# Patient Record
Sex: MALE | Race: OTHER
[De-identification: names, ages, dates, MRNs, and addresses within clinical notes are randomized per-mention and may not be internally consistent; named-entity substitution may affect disease eponyms.]

---

## 2018-04-04 ENCOUNTER — HOSPITAL ENCOUNTER (EMERGENCY)
Dept: HOSPITAL 72 - EMR | Age: 28
Discharge: HOME | End: 2018-04-04
Payer: COMMERCIAL

## 2018-04-04 VITALS — SYSTOLIC BLOOD PRESSURE: 106 MMHG | DIASTOLIC BLOOD PRESSURE: 67 MMHG

## 2018-04-04 VITALS — DIASTOLIC BLOOD PRESSURE: 67 MMHG | SYSTOLIC BLOOD PRESSURE: 106 MMHG

## 2018-04-04 VITALS — WEIGHT: 200 LBS | HEIGHT: 73 IN | BODY MASS INDEX: 26.51 KG/M2

## 2018-04-04 DIAGNOSIS — J06.9: Primary | ICD-10-CM

## 2018-04-04 PROCEDURE — 99284 EMERGENCY DEPT VISIT MOD MDM: CPT

## 2018-04-05 NOTE — EMERGENCY ROOM REPORT
History of Present Illness


General


Chief Complaint:  Upper Respiratory Illness


Source:  Patient





Present Illness


HPI


Patient present with complaints of cough and congestion


Ongoing for the past 4 days


Questionable low-grade fever


Denies any chest pain denies any back or flank pain


Cough has been productive


Patient has tried over-the-counter medicine as he was having more difficulty 

sleeping he presents to the ER





Denies any rash denies any recent travel denies any pleurisy


Allergies:  


Coded Allergies:  


     No Known Allergies (Unverified , 4/4/18)





Patient History


Past Medical History:  see triage record


Past Surgical History:  none


Reviewed Nursing Documentation:  PMH: Agreed; PSxH: Agreed





Nursing Documentation-PMH


Past Medical History:  No Stated History





Review of Systems


All Other Systems:  negative except mentioned in HPI





Physical Exam





Vital Signs








  Date Time  Temp Pulse Resp B/P (MAP) Pulse Ox O2 Delivery O2 Flow Rate FiO2


 


4/4/18 19:13 98.3 65 16 111/67 96 Room Air  





 98.2       








Sp02 EP Interpretation:  reviewed, normal


General Appearance:  well appearing, no apparent distress


Head:  normocephalic, atraumatic


Eyes:  bilateral eye PERRL, bilateral eye EOMI


ENT:  hearing grossly normal, normal pharynx, TMs + canals normal, uvula midline


Neck:  full range of motion, supple, no meningismus, no bony tend


Respiratory:  lungs clear, normal breath sounds, no rhonchi, no respiratory 

distress, no retraction, no accessory muscle use


Cardiovascular #1:  normal peripheral pulses, regular rate, rhythm, no edema, 

no gallop, no JVD, no murmur


Gastrointestinal:  normal bowel sounds, non tender, soft, no mass, no 

organomegaly, non-distended, no guarding, no hernia, no pulsatile mass, no 

rebound


Genitourinary:  no CVA tenderness


Musculoskeletal:  normal inspection


Neurologic:  oriented x3, responsive, CNs III-XII nml as tested, motor strength/

tone normal, sensory intact


Psychiatric:  mood/affect normal


Skin:  normal color, no rash, warm/dry, palpation normal


Lymphatic:  normal inspection, no adenopathy





Medical Decision Making


Diagnostic Impression:  


 Primary Impression:  


 Upper respiratory infection


ER Course


Patient appears clinically well


Lung sounds are appropriate


Hemodynamically appropriate and saturating well





I did not feel patient met emergency criteria for imaging will have initial 

conservative outpatient trial and return with any changes or concerns





Last Vital Signs








  Date Time  Temp Pulse Resp B/P (MAP) Pulse Ox O2 Delivery O2 Flow Rate FiO2


 


4/4/18 19:40 98.6 72 16 106/67 100 Room Air  





 98.2       








Status:  improved


Disposition:  HOME, SELF-CARE


Condition:  Stable


Scripts


Guaifenesin/Dextromethorphan (ROBITUSSIN COUGH-CHEST DM LIQ) 237 Ml Liquid


10 ML PO QHS for 7 Days, ML


   Prov: Caitlin Murphy DO         4/4/18 


Prednisone* (PREDNISONE*) 20 Mg Tablet


20 MG ORAL BID, #6 TAB


   Prov: Caitlin Murphy DO         4/4/18 


Albuterol Sulfate* (ALBUTEROL SULFATE MDI*) 8.5 Gm Hfa.aer.ad


2 PUFF INH Q6H, #1 EA 0 Refills


   Prov: Caitlin Murphy DO         4/4/18


Referrals:  


[facility],REFERRING (PCP)


Patient Instructions:  Upper Respiratory Infection, Adult





Additional Instructions:  


Patient is provided with the discharge instructions notified to follow up with 

primary doctor in the next 2-3 days otherwise return to the er with any 

worsening symptoms.


Please note that this report is being documented using DRAGON technology.  This 

can lead to erroneous entry secondary to incorrect interpretation by the 

dictating instrument.











Caitlin Murphy DO Apr 5, 2018 14:39

## 2019-01-12 ENCOUNTER — HOSPITAL ENCOUNTER (EMERGENCY)
Dept: HOSPITAL 72 - EMR | Age: 29
Discharge: HOME | End: 2019-01-12
Payer: COMMERCIAL

## 2019-01-12 VITALS — DIASTOLIC BLOOD PRESSURE: 74 MMHG | SYSTOLIC BLOOD PRESSURE: 124 MMHG

## 2019-01-12 VITALS — DIASTOLIC BLOOD PRESSURE: 76 MMHG | SYSTOLIC BLOOD PRESSURE: 124 MMHG

## 2019-01-12 VITALS — HEIGHT: 73 IN | BODY MASS INDEX: 25.45 KG/M2 | WEIGHT: 192 LBS

## 2019-01-12 DIAGNOSIS — N34.2: Primary | ICD-10-CM

## 2019-01-12 PROCEDURE — 99283 EMERGENCY DEPT VISIT LOW MDM: CPT

## 2019-01-12 PROCEDURE — 96372 THER/PROPH/DIAG INJ SC/IM: CPT

## 2019-01-12 NOTE — NUR
ED Nurse Note:



PT WALKED IN TO ER TODAY FROM HOME. AOX4. PT C/O LOWER ABDOMINAL PAIN, 6/10 X 3 
DAYS. PT DENIES ANY NAUSEA, VOMITING, OR DIARRHEA. ACTIVE BOWEL SOUNDS IN ALL 
QUADRANTS. ABDOMEN NONDISTENDED AND NONTENDER TO PALPATION. LAST BM X TODAY 
WHICH PT STATES WAS FORMED.

## 2019-01-12 NOTE — NUR
ED Nurse Note:



PT SITTING PEACEFULLY IN BED IN NAD. AOX4. PRESCRIPTION AND DISCHARGE PAPERWORK 
EXPLAINED TO PT. PT VERBALIZES UNDERSTANDING AND ALL QUESTIONS WERE ANSWERED. 
PRESCRIPTION AND DISCHARGE PAPERWORK GIVEN TO PT AND ID WRISTBAND REMOVED. PT 
WALKED OUT OF ER WITH STEADY GAIT AND ALL BELONGINGS.

## 2019-01-12 NOTE — EMERGENCY ROOM REPORT
History of Present Illness


General


Chief Complaint:  Abdominal Pain


Source:  Patient





Present Illness


HPI


28-year-old male presents ED for evaluation.  Patient presents with urethral 

discharge for last 3 days.  States he's been sexually active.  Denies any pain.

  Denies any dysuria.  Denies any rash.  Denies any abdominal pain.  Denies any 

testicular pain.  No other aggravating relieving factors.  Denies any other 

associated symptoms


Allergies:  


Coded Allergies:  


     No Known Allergies (Unverified , 4/4/18)





Patient History


Past Medical History:  none


Past Surgical History:  none


Pertinent Family History:  none


Social History:  Denies: smoking, alcohol use, drug use


Immunizations:  UTD


Reviewed Nursing Documentation:  PMH: Agreed; PSxH: Agreed





Nursing Documentation-PMH


Past Medical History:  No Stated History





Review of Systems


All Other Systems:  negative except mentioned in HPI





Physical Exam





Vital Signs








  Date Time  Temp Pulse Resp B/P (MAP) Pulse Ox O2 Delivery O2 Flow Rate FiO2


 


1/12/19 18:20 98.2 77 15 122/72 96 Room Air  








Sp02 EP Interpretation:  reviewed, normal


General Appearance:  no apparent distress, alert, GCS 15, non-toxic


Head:  normocephalic, atraumatic


Eyes:  bilateral eye normal inspection, bilateral eye PERRL


ENT:  hearing grossly normal, normal pharynx, no angioedema, normal voice


Neck:  full range of motion, supple/symm/no masses


Respiratory:  chest non-tender, lungs clear, normal breath sounds, speaking 

full sentences


Cardiovascular #1:  regular rate, rhythm, no edema


Cardiovascular #2:  2+ carotid (R), 2+ carotid (L), 2+ radial (R), 2+ radial (L)

, 2+ dorsalis pedis (R), 2+ dorsalis pedis (L)


Gastrointestinal:  normal bowel sounds, non tender, soft, non-distended, no 

guarding, no rebound


Rectal:  deferred


Genitourinary:  normal inspection, no CVA tenderness


Musculoskeletal:  back normal, gait/station normal, normal range of motion, non-

tender


Neurologic:  alert, oriented x3, responsive, motor strength/tone normal, 

sensory intact, speech normal


Psychiatric:  judgement/insight normal, memory normal, mood/affect normal, no 

suicidal/homicidal ideation


Reflexes:  3+ bicep (R), 3+ bicep (L), 3+ tricep (R), 3+ tricep (L), 3+ knee (R)

, 3+ knee (L)


Skin:  normal color, no rash, warm/dry, well hydrated


Lymphatic:  no adenopathy





Medical Decision Making


Diagnostic Impression:  


 Primary Impression:  


 Urethritis


ER Course


Hospital Course 


28-year-old male presents ED with urethral discharge.  History of unprotected 

sex





Differential diagnoses include: trichimonas, gonorrhea, chlamydia





Clinical course


Patient placed on stretcher.  After initial history physical exam reveals a 

young male in no acute distress.  Physical exam unremarkable.  No testicular 

pain or swelling.  No noted urethral discharge





We will treat him clinically for presumed STD.  Given Rocephin in ED.  We will 

discharged with doxycycline





Patient instructed to follow-up with STD clinic for STD testing.  We'll 

prescribe doxycycline.  We'll provide PMD referrals





Diagnosis - urethritis





Stable and discharged home with prescriptions for Rx doxycycline.  Instructed 

to followup with PMD.  Return to ED if symptoms recur or worsen





Last Vital Signs








  Date Time  Temp Pulse Resp B/P (MAP) Pulse Ox O2 Delivery O2 Flow Rate FiO2


 


1/12/19 18:53 98.5 76 15 124/76 96 Room Air  








Status:  improved


Disposition:  HOME, SELF-CARE


Condition:  Stable


Scripts


Doxycycline Monohydrate* (DOXYCYCLINE MONOHYDRATE*) 100 Mg Capsule


100 MG ORAL Q12H, #14 CAP 0 Refills


   Prov: Carlos Ward MD         1/12/19


Referrals:  


[facility],REFERRING (PCP)











[facility]











[facility]











[facility]


Patient Instructions:  Urethritis, Adult











Carlos Ward MD Jan 12, 2019 22:24

## 2024-09-28 ENCOUNTER — HOSPITAL ENCOUNTER (EMERGENCY)
Dept: HOSPITAL 87 - ER | Age: 34
Discharge: HOME | End: 2024-09-28
Payer: MEDICAID

## 2024-09-28 VITALS — OXYGEN SATURATION: 97 % | TEMPERATURE: 98.2 F

## 2024-09-28 VITALS
HEART RATE: 82 BPM | DIASTOLIC BLOOD PRESSURE: 62 MMHG | RESPIRATION RATE: 15 BRPM | OXYGEN SATURATION: 100 % | SYSTOLIC BLOOD PRESSURE: 126 MMHG

## 2024-09-28 VITALS — HEIGHT: 72 IN | WEIGHT: 196.21 LBS | BODY MASS INDEX: 26.58 KG/M2

## 2024-09-28 DIAGNOSIS — S61.213A: Primary | ICD-10-CM
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
## 2024-09-28 PROCEDURE — 99283 EMERGENCY DEPT VISIT LOW MDM: CPT

## 2024-09-28 PROCEDURE — 73140 X-RAY EXAM OF FINGER(S): CPT

## 2024-09-28 PROCEDURE — 12002 RPR S/N/AX/GEN/TRNK2.6-7.5CM: CPT
